# Patient Record
Sex: FEMALE | Race: ASIAN | NOT HISPANIC OR LATINO | URBAN - METROPOLITAN AREA
[De-identification: names, ages, dates, MRNs, and addresses within clinical notes are randomized per-mention and may not be internally consistent; named-entity substitution may affect disease eponyms.]

---

## 2023-01-13 ENCOUNTER — OUTPATIENT (OUTPATIENT)
Dept: OUTPATIENT SERVICES | Facility: HOSPITAL | Age: 83
LOS: 1 days | End: 2023-01-13
Payer: MEDICARE

## 2023-01-13 ENCOUNTER — APPOINTMENT (OUTPATIENT)
Dept: CT IMAGING | Facility: CLINIC | Age: 83
End: 2023-01-13

## 2023-01-13 DIAGNOSIS — Z00.8 ENCOUNTER FOR OTHER GENERAL EXAMINATION: ICD-10-CM

## 2023-01-13 PROCEDURE — 82565 ASSAY OF CREATININE: CPT

## 2023-01-13 PROCEDURE — G1004: CPT

## 2023-01-13 PROCEDURE — 70498 CT ANGIOGRAPHY NECK: CPT | Mod: 26,ME

## 2023-01-13 PROCEDURE — 70496 CT ANGIOGRAPHY HEAD: CPT | Mod: 26,ME

## 2023-01-13 PROCEDURE — 70498 CT ANGIOGRAPHY NECK: CPT | Mod: ME

## 2023-01-13 PROCEDURE — 70496 CT ANGIOGRAPHY HEAD: CPT | Mod: ME

## 2023-02-08 ENCOUNTER — OFFICE (OUTPATIENT)
Dept: URBAN - METROPOLITAN AREA CLINIC 76 | Facility: CLINIC | Age: 83
Setting detail: OPHTHALMOLOGY
End: 2023-02-08
Payer: MEDICARE

## 2023-02-08 DIAGNOSIS — H01.004: ICD-10-CM

## 2023-02-08 DIAGNOSIS — H01.001: ICD-10-CM

## 2023-02-08 DIAGNOSIS — H16.223: ICD-10-CM

## 2023-02-08 DIAGNOSIS — H25.12: ICD-10-CM

## 2023-02-08 DIAGNOSIS — H11.153: ICD-10-CM

## 2023-02-08 PROCEDURE — 92014 COMPRE OPH EXAM EST PT 1/>: CPT | Performed by: OPHTHALMOLOGY

## 2023-02-08 ASSESSMENT — KERATOMETRY
METHOD_AUTO_MANUAL: AUTO
OS_K2POWER_DIOPTERS: 55.00
OD_K1POWER_DIOPTERS: 43.75
OD_AXISANGLE_DEGREES: 165
OS_K1POWER_DIOPTERS: 43.25
OS_AXISANGLE_DEGREES: 088
OD_K2POWER_DIOPTERS: 44.50

## 2023-02-08 ASSESSMENT — SPHEQUIV_DERIVED
OS_SPHEQUIV: 2
OD_SPHEQUIV: 0.75
OD_SPHEQUIV: 0.875
OS_SPHEQUIV: 1

## 2023-02-08 ASSESSMENT — REFRACTION_AUTOREFRACTION
OD_AXIS: 085
OS_AXIS: 145
OD_CYLINDER: -1.25
OS_CYLINDER: -2.50
OD_SPHERE: +1.50
OS_SPHERE: +3.25

## 2023-02-08 ASSESSMENT — VISUAL ACUITY
OS_BCVA: 20/20
OD_BCVA: 20/40-1

## 2023-02-08 ASSESSMENT — REFRACTION_MANIFEST
OS_CYLINDER: -0.50
OS_SPHERE: +1.25
OD_ADD: +2.75
OS_AXIS: 120
OS_ADD: +2.75
OD_VA1: 20/30
OD_SPHERE: +1.50
OD_AXIS: 80
OS_VA1: 20/30
OD_CYLINDER: -1.50

## 2023-02-08 ASSESSMENT — AXIALLENGTH_DERIVED
OS_AL: 21.37
OD_AL: 23.0349
OS_AL: 21.05
OD_AL: 23.0814

## 2023-02-08 ASSESSMENT — LID EXAM ASSESSMENTS
OD_BLEPHARITIS: RUL T
OS_BLEPHARITIS: LUL T

## 2023-02-08 ASSESSMENT — CONFRONTATIONAL VISUAL FIELD TEST (CVF)
OD_FINDINGS: FULL
OS_FINDINGS: FULL

## 2023-05-30 ENCOUNTER — RX ONLY (RX ONLY)
Age: 83
End: 2023-05-30

## 2023-05-30 ENCOUNTER — OFFICE (OUTPATIENT)
Dept: URBAN - METROPOLITAN AREA CLINIC 76 | Facility: CLINIC | Age: 83
Setting detail: OPHTHALMOLOGY
End: 2023-05-30
Payer: MEDICARE

## 2023-05-30 DIAGNOSIS — H25.12: ICD-10-CM

## 2023-05-30 DIAGNOSIS — H01.001: ICD-10-CM

## 2023-05-30 DIAGNOSIS — H01.004: ICD-10-CM

## 2023-05-30 DIAGNOSIS — Z96.1: ICD-10-CM

## 2023-05-30 DIAGNOSIS — T15.11XA: ICD-10-CM

## 2023-05-30 DIAGNOSIS — H16.223: ICD-10-CM

## 2023-05-30 DIAGNOSIS — H11.153: ICD-10-CM

## 2023-05-30 PROCEDURE — 99213 OFFICE O/P EST LOW 20 MIN: CPT | Performed by: OPHTHALMOLOGY

## 2023-05-30 ASSESSMENT — LID EXAM ASSESSMENTS
OS_BLEPHARITIS: LUL T
OD_BLEPHARITIS: RUL T

## 2023-05-30 ASSESSMENT — TONOMETRY
OD_IOP_MMHG: 13
OS_IOP_MMHG: 12

## 2023-05-30 ASSESSMENT — CONFRONTATIONAL VISUAL FIELD TEST (CVF)
OD_FINDINGS: FULL
OS_FINDINGS: FULL

## 2023-06-01 ASSESSMENT — KERATOMETRY
OD_K1POWER_DIOPTERS: 43.75
OS_AXISANGLE_DEGREES: 088
OS_K2POWER_DIOPTERS: 55.00
OS_K1POWER_DIOPTERS: 43.25
OD_AXISANGLE_DEGREES: 165
METHOD_AUTO_MANUAL: AUTO
OD_K2POWER_DIOPTERS: 44.50

## 2023-06-01 ASSESSMENT — REFRACTION_MANIFEST
OD_VA1: 20/30
OS_ADD: +2.75
OS_CYLINDER: -0.50
OD_CYLINDER: -1.50
OS_VA1: 20/30
OD_SPHERE: +1.50
OD_ADD: +2.75
OS_SPHERE: +1.25
OD_AXIS: 80
OS_AXIS: 120

## 2023-06-01 ASSESSMENT — REFRACTION_AUTOREFRACTION
OD_AXIS: 085
OS_CYLINDER: -2.50
OS_SPHERE: +3.25
OD_SPHERE: +1.50
OS_AXIS: 145
OD_CYLINDER: -1.25

## 2023-06-01 ASSESSMENT — SPHEQUIV_DERIVED
OS_SPHEQUIV: 2
OD_SPHEQUIV: 0.875
OS_SPHEQUIV: 1
OD_SPHEQUIV: 0.75

## 2023-06-01 ASSESSMENT — VISUAL ACUITY
OD_BCVA: 20/50
OS_BCVA: 20/25

## 2023-06-01 ASSESSMENT — AXIALLENGTH_DERIVED
OS_AL: 21.05
OS_AL: 21.37
OD_AL: 23.0814
OD_AL: 23.0349

## 2023-07-06 ENCOUNTER — OFFICE (OUTPATIENT)
Dept: URBAN - METROPOLITAN AREA CLINIC 76 | Facility: CLINIC | Age: 83
Setting detail: OPHTHALMOLOGY
End: 2023-07-06
Payer: MEDICARE

## 2023-07-06 DIAGNOSIS — Z96.1: ICD-10-CM

## 2023-07-06 DIAGNOSIS — H25.12: ICD-10-CM

## 2023-07-06 DIAGNOSIS — H11.153: ICD-10-CM

## 2023-07-06 DIAGNOSIS — H16.223: ICD-10-CM

## 2023-07-06 DIAGNOSIS — H01.004: ICD-10-CM

## 2023-07-06 DIAGNOSIS — H01.001: ICD-10-CM

## 2023-07-06 PROCEDURE — 99213 OFFICE O/P EST LOW 20 MIN: CPT | Performed by: OPHTHALMOLOGY

## 2023-07-06 ASSESSMENT — TONOMETRY
OS_IOP_MMHG: 12
OD_IOP_MMHG: 12

## 2023-07-06 ASSESSMENT — SPHEQUIV_DERIVED
OD_SPHEQUIV: 0.75
OD_SPHEQUIV: 0.75
OS_SPHEQUIV: 1
OS_SPHEQUIV: 1.125
OD_SPHEQUIV: 0.75

## 2023-07-06 ASSESSMENT — VISUAL ACUITY
OD_BCVA: 20/40-2
OS_BCVA: 20/25

## 2023-07-06 ASSESSMENT — AXIALLENGTH_DERIVED
OD_AL: 23.1255
OS_AL: 23.1643
OD_AL: 23.1255
OS_AL: 23.1174
OD_AL: 23.1255

## 2023-07-06 ASSESSMENT — REFRACTION_MANIFEST
OS_SPHERE: +1.50
OD_CYLINDER: -1.50
OS_ADD: +2.75
OD_SPHERE: +1.50
OD_SPHERE: +1.50
OS_SPHERE: +1.25
OD_ADD: +2.75
OD_ADD: +2.75
OD_AXIS: 80
OS_VA1: 20/30
OS_CYLINDER: SPH
OD_VA1: 20/25
OS_VA1: 20/40+
OS_CYLINDER: -0.50
OS_AXIS: 120
OD_VA1: 20/30
OD_AXIS: 80
OS_ADD: +2.75
OD_CYLINDER: -1.50

## 2023-07-06 ASSESSMENT — CONFRONTATIONAL VISUAL FIELD TEST (CVF)
OS_FINDINGS: FULL
OD_FINDINGS: FULL

## 2023-07-06 ASSESSMENT — KERATOMETRY
OS_K2POWER_DIOPTERS: 44.00
OD_AXISANGLE_DEGREES: 168
OS_K1POWER_DIOPTERS: 43.25
OD_K1POWER_DIOPTERS: 43.50
OD_K2POWER_DIOPTERS: 44.50
OS_AXISANGLE_DEGREES: 49

## 2023-07-06 ASSESSMENT — REFRACTION_AUTOREFRACTION
OD_SPHERE: +1.75
OS_AXIS: 112
OS_SPHERE: +1.75
OD_AXIS: 83
OS_CYLINDER: -1.25
OD_CYLINDER: -2.00

## 2023-07-06 ASSESSMENT — LID EXAM ASSESSMENTS
OS_MEIBOMITIS: 1+
OD_MEIBOMITIS: 1+
OS_BLEPHARITIS: LUL T
OD_BLEPHARITIS: RUL T

## 2024-05-14 ENCOUNTER — APPOINTMENT (OUTPATIENT)
Dept: RADIOLOGY | Facility: HOSPITAL | Age: 84
End: 2024-05-14
Payer: MEDICARE

## 2024-05-14 ENCOUNTER — OUTPATIENT (OUTPATIENT)
Dept: OUTPATIENT SERVICES | Facility: HOSPITAL | Age: 84
LOS: 1 days | End: 2024-05-14

## 2024-05-14 DIAGNOSIS — R11.0 NAUSEA: ICD-10-CM

## 2024-05-14 PROCEDURE — 74240 X-RAY XM UPR GI TRC 1CNTRST: CPT | Mod: 26

## 2024-07-12 ENCOUNTER — OUTPATIENT (OUTPATIENT)
Dept: OUTPATIENT SERVICES | Facility: HOSPITAL | Age: 84
LOS: 1 days | End: 2024-07-12

## 2024-07-12 ENCOUNTER — APPOINTMENT (OUTPATIENT)
Dept: CT IMAGING | Facility: CLINIC | Age: 84
End: 2024-07-12

## 2024-07-12 DIAGNOSIS — R11.10 VOMITING, UNSPECIFIED: ICD-10-CM

## 2024-07-20 ENCOUNTER — OUTPATIENT (OUTPATIENT)
Dept: OUTPATIENT SERVICES | Facility: HOSPITAL | Age: 84
LOS: 1 days | End: 2024-07-20

## 2024-07-20 DIAGNOSIS — R11.0 NAUSEA: ICD-10-CM

## 2024-07-22 ENCOUNTER — APPOINTMENT (OUTPATIENT)
Dept: CT IMAGING | Facility: CLINIC | Age: 84
End: 2024-07-22
Payer: MEDICARE

## 2024-07-22 PROCEDURE — 74177 CT ABD & PELVIS W/CONTRAST: CPT | Mod: 26

## 2024-07-30 ENCOUNTER — APPOINTMENT (OUTPATIENT)
Dept: HEPATOLOGY | Facility: CLINIC | Age: 84
End: 2024-07-30

## 2024-08-02 ENCOUNTER — APPOINTMENT (OUTPATIENT)
Dept: HEPATOLOGY | Facility: CLINIC | Age: 84
End: 2024-08-02

## 2024-08-02 VITALS
HEART RATE: 60 BPM | TEMPERATURE: 98 F | HEIGHT: 60 IN | BODY MASS INDEX: 30.43 KG/M2 | OXYGEN SATURATION: 98 % | SYSTOLIC BLOOD PRESSURE: 167 MMHG | RESPIRATION RATE: 14 BRPM | DIASTOLIC BLOOD PRESSURE: 74 MMHG | WEIGHT: 155 LBS

## 2024-08-02 DIAGNOSIS — Z85.89 PERSONAL HISTORY OF MALIGNANT NEOPLASM OF OTHER ORGANS AND SYSTEMS: ICD-10-CM

## 2024-08-02 DIAGNOSIS — K76.9 LIVER DISEASE, UNSPECIFIED: ICD-10-CM

## 2024-08-02 DIAGNOSIS — E66.9 OBESITY, UNSPECIFIED: ICD-10-CM

## 2024-08-02 DIAGNOSIS — Z85.3 PERSONAL HISTORY OF MALIGNANT NEOPLASM OF BREAST: ICD-10-CM

## 2024-08-02 DIAGNOSIS — K75.9 INFLAMMATORY LIVER DISEASE, UNSPECIFIED: ICD-10-CM

## 2024-08-02 DIAGNOSIS — Z78.9 OTHER SPECIFIED HEALTH STATUS: ICD-10-CM

## 2024-08-02 DIAGNOSIS — K86.89 OTHER SPECIFIED DISEASES OF PANCREAS: ICD-10-CM

## 2024-08-02 PROCEDURE — G2211 COMPLEX E/M VISIT ADD ON: CPT

## 2024-08-02 PROCEDURE — 99204 OFFICE O/P NEW MOD 45 MIN: CPT

## 2024-08-02 RX ORDER — LETROZOLE TABLETS 2.5 MG/1
2.5 TABLET, FILM COATED ORAL
Refills: 0 | Status: ACTIVE | COMMUNITY

## 2024-08-05 ENCOUNTER — APPOINTMENT (OUTPATIENT)
Dept: ULTRASOUND IMAGING | Facility: CLINIC | Age: 84
End: 2024-08-05

## 2024-08-05 LAB
AFP-TM SERPL-MCNC: 4 NG/ML
ALBUMIN SERPL ELPH-MCNC: 4.3 G/DL
ALP BLD-CCNC: 91 U/L
ALT SERPL-CCNC: 15 U/L
ANION GAP SERPL CALC-SCNC: 12 MMOL/L
AST SERPL-CCNC: 18 U/L
BASOPHILS # BLD AUTO: 0.02 K/UL
BASOPHILS NFR BLD AUTO: 0.4 %
BILIRUB SERPL-MCNC: 0.2 MG/DL
BUN SERPL-MCNC: 25 MG/DL
CALCIUM SERPL-MCNC: 10 MG/DL
CANCER AG19-9 SERPL-ACNC: 8 U/ML
CHLORIDE SERPL-SCNC: 107 MMOL/L
CO2 SERPL-SCNC: 23 MMOL/L
CREAT SERPL-MCNC: 0.82 MG/DL
EGFR: 70 ML/MIN/1.73M2
EOSINOPHIL # BLD AUTO: 0.1 K/UL
EOSINOPHIL NFR BLD AUTO: 1.8 %
GLUCOSE SERPL-MCNC: 83 MG/DL
HBV CORE IGG+IGM SER QL: REACTIVE
HBV SURFACE AB SER QL: REACTIVE
HBV SURFACE AG SER QL: NONREACTIVE
HCT VFR BLD CALC: 36.7 %
HCV AB SER QL: NONREACTIVE
HCV S/CO RATIO: 0.11 S/CO
HEPATITIS A IGG ANTIBODY: REACTIVE
HGB BLD-MCNC: 11.5 G/DL
IMM GRANULOCYTES NFR BLD AUTO: 0.4 %
INR PPP: 1.05 RATIO
LYMPHOCYTES # BLD AUTO: 1.89 K/UL
LYMPHOCYTES NFR BLD AUTO: 34.5 %
MAN DIFF?: NORMAL
MCHC RBC-ENTMCNC: 30.7 PG
MCHC RBC-ENTMCNC: 31.3 GM/DL
MCV RBC AUTO: 97.9 FL
MONOCYTES # BLD AUTO: 0.59 K/UL
MONOCYTES NFR BLD AUTO: 10.8 %
NEUTROPHILS # BLD AUTO: 2.86 K/UL
NEUTROPHILS NFR BLD AUTO: 52.1 %
PLATELET # BLD AUTO: 188 K/UL
POTASSIUM SERPL-SCNC: 4.2 MMOL/L
PROT SERPL-MCNC: 7.2 G/DL
PT BLD: 12 SEC
RBC # BLD: 3.75 M/UL
RBC # FLD: 15.9 %
SODIUM SERPL-SCNC: 142 MMOL/L
WBC # FLD AUTO: 5.48 K/UL

## 2024-08-05 PROCEDURE — 76700 US EXAM ABDOM COMPLETE: CPT

## 2024-08-05 NOTE — HISTORY OF PRESENT ILLNESS
[FreeTextEntry1] : Ms. BIGGS is an 83-year-old woman who was referred by her gastroenterologist, Dr. Briseno, because of a liver lesion. An ultrasound in 2023 was normal, and a CT scan in 2024, done because of early satiety, showed a 1.3 cm hyperenhancing lesion, otherwise normal liver, normal spleen, but a mildly atrophic pancreas. She tells me she had minor hepatitis that she got from her  and was not treated. She denies any history of significant abdominal pain, pruritus, and joint swelling. Answers that she has sciatica. PMH: breast cancer s/p lumpectomy and removal of a brain metastasis, rheumatoid arthritis.     Alcohol history: never drank. SHx: never smoker. FHx: father  at age 104, mother smoked and  at age 84. Sister is 96. She has three living children, son  from COVID, and one daughter from breast cancer.          She used to run daily until age 78. Weight history: 155 lbs, BMI 30.3 in 2024. Gained 50 lbs since the she moved to [facility] right after the COVID pandemic. Remedies/OTC meds: -    ROS: Constitutional: no fever, fatigue, weight gain/loss. Eyes: no eye pain or discharge. ENT: no nosebleed, earache, change in hearing. CVS: denies chest pain, palpitations, claudication, irregular heartbeat. Resp: denies SOB, wheezing, cough, SOB on exertion. GI: denies abdominal pain, vomiting, diarrhea, heartburn, melena. Genitourinary: denies dysuria, incontinence. Musculoskeletal: denies joint pain, joint stiffness. Integumentary: denies pruritus, skin lesions, rash. Neuro: denies confusion, dizziness, fainting. Psych: denies anxiety, depression, change in personality. Endo: denies muscle weakness. Heme/lymph: denies easy bleeding and bruising.   Test results: - 24 CT abdomen/pelvis w: liver lesion segment 7/8, 1.3 cm, hyperenhancing with surrounding edema and washout. GB and spleen nl. Pancreas mildly atrophic. - 23 US abdomen (scanned): liver normal. GB and spleen normal.  - colonoscopy: -   Physical exam: Gen: A&Ox3, NAD, obese HEENT: normal outer ears & nose, PERRL, mucus membranes moist, anicteric, no lymphadenopathy. Bilat. arcus senilis CVS: regular rate and rhythm, no murmur Pulm: vesicular breath sounds Abdomen: normal bowel sounds, soft, nontender, no hepatosplenomegaly Legs: no edema, no clubbing Musculoskeletal: normal gait Skin: no rash. Minimal wrinkles. Neuro: no asterixis, EOMI Psych: normal affect

## 2024-08-05 NOTE — ASSESSMENT
[FreeTextEntry1] : Ms. BIGGS is an 83-year-old woman with history of breast cancer s/p lumpectomy and removal of a brain metastasis, rheumatoid arthritis, who was referred by her gastroenterologist, Dr. Briseno, because of a   # liver lesion seen on CT scan in July 2024 showed a 1.3 cm hyperenhancing lesion. Done because of dyspepsia. - liver morphology normal, spleen not enlarged - some hepatitis she got from her  - cannot get MRI due to metal in her brain - no physical exam findings of chronic liver disease. Remarkably few wrinkles - skin looks 30 yrs younger.  # colon cancer screening: Dr. Briseno, last colonoscopy >10 yrs ago # shrank, kyphosis, likely has osteoporosis  PCP: Dr. Dutsi Behm, [place]  Plan: - bloodwork - US abdomen as she may need biopsy of the liver lesion - she will bring a CD with the CT images within the next few days - return in 1 month Addendum 8/5/24: order ed chest CT, left voicemail for patient.

## 2024-08-05 NOTE — HISTORY OF PRESENT ILLNESS
[FreeTextEntry1] : Ms. BIGGS is an 83-year-old woman who was referred by her gastroenterologist, Dr. Briseno, because of a liver lesion. An ultrasound in 2023 was normal, and a CT scan in 2024, done because of early satiety, showed a 1.3 cm hyperenhancing lesion, otherwise normal liver, normal spleen, but a mildly atrophic pancreas. She tells me she had minor hepatitis that she got from her  and was not treated. She denies any history of significant abdominal pain, pruritus, and joint swelling. Answers that she has sciatica. PMH: breast cancer s/p lumpectomy and removal of a brain metastasis, rheumatoid arthritis.     Alcohol history: never drank. SHx: never smoker. FHx: father  at age 104, mother smoked and  at age 84. Sister is 96. She has three living children, son  from COVID, and one daughter from breast cancer.          She used to run daily until age 78. Weight history: 155 lbs, BMI 30.3 in 2024. Gained 50 lbs since the she moved to [facility] right after the COVID pandemic. Remedies/OTC meds: -    ROS: Constitutional: no fever, fatigue, weight gain/loss. Eyes: no eye pain or discharge. ENT: no nosebleed, earache, change in hearing. CVS: denies chest pain, palpitations, claudication, irregular heartbeat. Resp: denies SOB, wheezing, cough, SOB on exertion. GI: denies abdominal pain, vomiting, diarrhea, heartburn, melena. Genitourinary: denies dysuria, incontinence. Musculoskeletal: denies joint pain, joint stiffness. Integumentary: denies pruritus, skin lesions, rash. Neuro: denies confusion, dizziness, fainting. Psych: denies anxiety, depression, change in personality. Endo: denies muscle weakness. Heme/lymph: denies easy bleeding and bruising.   Test results: - 24 CT abdomen/pelvis w: liver lesion segment 7/8, 1.3 cm, hyperenhancing with surrounding edema and washout. GB and spleen nl. Pancreas mildly atrophic. - 23 US abdomen (scanned): liver normal. GB and spleen normal.  - colonoscopy: -   Physical exam: Gen: A&Ox3, NAD, obese HEENT: normal outer ears & nose, PERRL, mucus membranes moist, anicteric, no lymphadenopathy. Bilat. arcus senilis CVS: regular rate and rhythm, no murmur Pulm: vesicular breath sounds Abdomen: normal bowel sounds, soft, nontender, no hepatosplenomegaly Legs: no edema, no clubbing Musculoskeletal: normal gait Skin: no rash. Minimal wrinkles. Neuro: no asterixis, EOMI Psych: normal affect

## 2024-08-15 DIAGNOSIS — K76.9 LIVER DISEASE, UNSPECIFIED: ICD-10-CM

## 2024-09-03 ENCOUNTER — APPOINTMENT (OUTPATIENT)
Dept: HEPATOLOGY | Facility: CLINIC | Age: 84
End: 2024-09-03

## 2024-09-27 ENCOUNTER — APPOINTMENT (OUTPATIENT)
Dept: CT IMAGING | Facility: CLINIC | Age: 84
End: 2024-09-27

## 2024-10-02 ENCOUNTER — NON-APPOINTMENT (OUTPATIENT)
Age: 84
End: 2024-10-02

## 2024-10-03 ENCOUNTER — OUTPATIENT (OUTPATIENT)
Dept: OUTPATIENT SERVICES | Facility: HOSPITAL | Age: 84
LOS: 1 days | End: 2024-10-03
Payer: COMMERCIAL

## 2024-10-03 ENCOUNTER — APPOINTMENT (OUTPATIENT)
Dept: CT IMAGING | Facility: IMAGING CENTER | Age: 84
End: 2024-10-03

## 2024-10-03 DIAGNOSIS — K76.9 LIVER DISEASE, UNSPECIFIED: ICD-10-CM

## 2024-10-03 PROCEDURE — 74170 CT ABD WO CNTRST FLWD CNTRST: CPT | Mod: 26

## 2024-10-03 PROCEDURE — 74170 CT ABD WO CNTRST FLWD CNTRST: CPT
